# Patient Record
Sex: FEMALE | URBAN - METROPOLITAN AREA
[De-identification: names, ages, dates, MRNs, and addresses within clinical notes are randomized per-mention and may not be internally consistent; named-entity substitution may affect disease eponyms.]

---

## 2021-08-20 ENCOUNTER — APPOINTMENT (RX ONLY)
Dept: URBAN - METROPOLITAN AREA CLINIC 157 | Facility: CLINIC | Age: 51
Setting detail: DERMATOLOGY
End: 2021-08-20

## 2021-08-20 VITALS — HEIGHT: 63 IN | WEIGHT: 156 LBS

## 2021-08-20 DIAGNOSIS — L82.1 OTHER SEBORRHEIC KERATOSIS: ICD-10-CM

## 2021-08-20 DIAGNOSIS — L57.8 OTHER SKIN CHANGES DUE TO CHRONIC EXPOSURE TO NONIONIZING RADIATION: ICD-10-CM | Status: INADEQUATELY CONTROLLED

## 2021-08-20 DIAGNOSIS — D22 MELANOCYTIC NEVI: ICD-10-CM | Status: INADEQUATELY CONTROLLED

## 2021-08-20 DIAGNOSIS — L81.4 OTHER MELANIN HYPERPIGMENTATION: ICD-10-CM

## 2021-08-20 PROBLEM — D22.39 MELANOCYTIC NEVI OF OTHER PARTS OF FACE: Status: ACTIVE | Noted: 2021-08-20

## 2021-08-20 PROCEDURE — 11310 SHAVE SKIN LESION 0.5 CM/<: CPT | Mod: 76

## 2021-08-20 PROCEDURE — ? COUNSELING

## 2021-08-20 PROCEDURE — ? SHAVE REMOVAL

## 2021-08-20 PROCEDURE — 11310 SHAVE SKIN LESION 0.5 CM/<: CPT

## 2021-08-20 PROCEDURE — 99243 OFF/OP CNSLTJ NEW/EST LOW 30: CPT | Mod: 25

## 2021-08-20 ASSESSMENT — LOCATION DETAILED DESCRIPTION DERM
LOCATION DETAILED: MID TRAPEZIAL NECK
LOCATION DETAILED: SUPERIOR THORACIC SPINE
LOCATION DETAILED: STERNAL NOTCH
LOCATION DETAILED: LEFT MEDIAL MALAR CHEEK
LOCATION DETAILED: RIGHT CENTRAL MALAR CHEEK
LOCATION DETAILED: SUBMENTAL CHIN
LOCATION DETAILED: RIGHT SUPERIOR LATERAL MALAR CHEEK
LOCATION DETAILED: LEFT SUPERIOR LATERAL MALAR CHEEK
LOCATION DETAILED: LEFT SUPERIOR PREAURICULAR CHEEK
LOCATION DETAILED: RIGHT MEDIAL SUPERIOR CHEST
LOCATION DETAILED: RIGHT INFERIOR CENTRAL MALAR CHEEK
LOCATION DETAILED: RIGHT MID TEMPLE
LOCATION DETAILED: RIGHT ANTERIOR DISTAL THIGH
LOCATION DETAILED: LEFT CENTRAL MALAR CHEEK

## 2021-08-20 ASSESSMENT — LOCATION SIMPLE DESCRIPTION DERM
LOCATION SIMPLE: RIGHT CHEEK
LOCATION SIMPLE: SUBMENTAL CHIN
LOCATION SIMPLE: RIGHT THIGH
LOCATION SIMPLE: TRAPEZIAL NECK
LOCATION SIMPLE: RIGHT TEMPLE
LOCATION SIMPLE: CHEST
LOCATION SIMPLE: LEFT CHEEK
LOCATION SIMPLE: UPPER BACK

## 2021-08-20 ASSESSMENT — LOCATION ZONE DERM
LOCATION ZONE: FACE
LOCATION ZONE: NECK
LOCATION ZONE: TRUNK
LOCATION ZONE: LEG

## 2021-08-20 NOTE — PROCEDURE: SHAVE REMOVAL
Biopsy Method: Dermablade
Render Post-Care Instructions In Note?: no
Post-Care Instructions: I reviewed with the patient in detail post-care instructions. Patient is to keep the biopsy site dry overnight, and then apply bacitracin twice daily until healed. Patient may apply hydrogen peroxide soaks to remove any crusting.
Anesthesia Type: 1% lidocaine without epinephrine
X Size Of Lesion In Cm (Optional): 0
Medical Necessity Information: It is in your best interest to select a reason for this procedure from the list below. All of these items fulfill various CMS LCD requirements except the new and changing color options.
Consent was obtained from the patient. The risks and benefits to therapy were discussed in detail. Specifically, the risks of infection, scarring, bleeding, prolonged wound healing, incomplete removal, allergy to anesthesia, nerve injury and recurrence were addressed. Prior to the procedure, the treatment site was clearly identified and confirmed by the patient. All components of Universal Protocol/PAUSE Rule completed.
Was A Bandage Applied: Yes
Wound Care: Petrolatum
Hemostasis: Johnny's
Size Of Lesion In Cm (Required): 0.5
Billing Type: Third-Party Bill
Medical Necessity Clause: This procedure was medically necessary because the lesion that was treated was:
Notification Instructions: Patient will be notified of pathology results. However, patient instructed to call the office if not contacted within 2 weeks.
Detail Level: Detailed
Size Of Lesion In Cm (Required): 0.2

## 2022-03-18 ENCOUNTER — APPOINTMENT (RX ONLY)
Dept: URBAN - METROPOLITAN AREA CLINIC 157 | Facility: CLINIC | Age: 52
Setting detail: DERMATOLOGY
End: 2022-03-18

## 2022-03-18 VITALS — HEIGHT: 63 IN | WEIGHT: 155 LBS

## 2022-03-18 DIAGNOSIS — L81.4 OTHER MELANIN HYPERPIGMENTATION: ICD-10-CM

## 2022-03-18 DIAGNOSIS — L57.8 OTHER SKIN CHANGES DUE TO CHRONIC EXPOSURE TO NONIONIZING RADIATION: ICD-10-CM

## 2022-03-18 DIAGNOSIS — D49.2 NEOPLASM OF UNSPECIFIED BEHAVIOR OF BONE, SOFT TISSUE, AND SKIN: ICD-10-CM | Status: INADEQUATELY CONTROLLED

## 2022-03-18 PROCEDURE — ? BIOPSY BY SHAVE METHOD

## 2022-03-18 PROCEDURE — ? COUNSELING

## 2022-03-18 PROCEDURE — 99213 OFFICE O/P EST LOW 20 MIN: CPT | Mod: 25

## 2022-03-18 PROCEDURE — 11102 TANGNTL BX SKIN SINGLE LES: CPT

## 2022-03-18 ASSESSMENT — LOCATION ZONE DERM
LOCATION ZONE: TRUNK
LOCATION ZONE: ARM

## 2022-03-18 ASSESSMENT — LOCATION SIMPLE DESCRIPTION DERM
LOCATION SIMPLE: CHEST
LOCATION SIMPLE: LEFT FOREARM
LOCATION SIMPLE: RIGHT FOREARM

## 2022-03-18 ASSESSMENT — LOCATION DETAILED DESCRIPTION DERM
LOCATION DETAILED: LEFT DISTAL DORSAL FOREARM
LOCATION DETAILED: RIGHT PROXIMAL RADIAL DORSAL FOREARM
LOCATION DETAILED: RIGHT MEDIAL SUPERIOR CHEST
LOCATION DETAILED: LEFT MEDIAL SUPERIOR CHEST
LOCATION DETAILED: LEFT LATERAL SUPERIOR CHEST
LOCATION DETAILED: RIGHT LATERAL SUPERIOR CHEST

## 2022-03-18 NOTE — HPI: SKIN LESION
Is This A New Presentation, Or A Follow-Up?: Skin Lesion
How Severe Is Your Skin Lesion?: mild
Has Your Skin Lesion Been Treated?: not been treated
What Type Of Note Output Would You Prefer (Optional)?: Standard Output
Additional History: Pt applied Neosporin but lesion will not heal

## 2022-03-18 NOTE — PROCEDURE: BIOPSY BY SHAVE METHOD
Consent was obtained and risks were reviewed including but not limited to scarring, infection, bleeding, scabbing, incomplete removal, nerve damage and allergy to anesthesia.
Hide Additional Anticipated Plan?: No
X Size Of Lesion In Cm: 0
Depth Of Biopsy: dermis
Hemostasis: Johnny's
Detail Level: Detailed
Silver Nitrate Text: The wound bed was treated with silver nitrate after the biopsy was performed.
Notification Instructions: Patient will be notified of biopsy results. However, patient instructed to call the office if not contacted within 2 weeks.
Wound Care: Petrolatum
Biopsy Type: H and E
Dressing: bandage
Curettage Text: The wound bed was treated with curettage after the biopsy was performed.
Anesthesia Volume In Cc: 0.5
Billing Type: Third-Party Bill
Anesthesia Type: 1% lidocaine without epinephrine
Electrodesiccation And Curettage Text: The wound bed was treated with electrodesiccation and curettage after the biopsy was performed.
Post-Care Instructions: I reviewed with the patient in detail post-care instructions. Patient is to keep the biopsy site dry overnight, and then apply bacitracin twice daily until healed. Patient may apply hydrogen peroxide soaks to remove any crusting.
Was A Bandage Applied: Yes
Electrodesiccation Text: The wound bed was treated with electrodesiccation after the biopsy was performed.
Type Of Destruction Used: Curettage
Biopsy Method: Dermablade
Cryotherapy Text: The wound bed was treated with cryotherapy after the biopsy was performed.
Information: Selecting Yes will display possible errors in your note based on the variables you have selected. This validation is only offered as a suggestion for you. PLEASE NOTE THAT THE VALIDATION TEXT WILL BE REMOVED WHEN YOU FINALIZE YOUR NOTE. IF YOU WANT TO FAX A PRELIMINARY NOTE YOU WILL NEED TO TOGGLE THIS TO 'NO' IF YOU DO NOT WANT IT IN YOUR FAXED NOTE.